# Patient Record
Sex: MALE | Race: WHITE | NOT HISPANIC OR LATINO | Employment: FULL TIME | URBAN - METROPOLITAN AREA
[De-identification: names, ages, dates, MRNs, and addresses within clinical notes are randomized per-mention and may not be internally consistent; named-entity substitution may affect disease eponyms.]

---

## 2017-01-25 ENCOUNTER — OFFICE VISIT (OUTPATIENT)
Dept: OPHTHALMOLOGY | Facility: CLINIC | Age: 62
End: 2017-01-25
Payer: COMMERCIAL

## 2017-01-25 DIAGNOSIS — Z96.1 PSEUDOPHAKIA: Primary | ICD-10-CM

## 2017-01-25 PROCEDURE — 99024 POSTOP FOLLOW-UP VISIT: CPT | Performed by: OPHTHALMOLOGY

## 2017-01-25 ASSESSMENT — VISUAL ACUITY
OD_SC: 20/15
CORRECTION_TYPE: GLASSES
OS_CC: 20/25
OD_SC+: -2
METHOD: SNELLEN - LINEAR

## 2017-01-25 ASSESSMENT — SLIT LAMP EXAM - LIDS
COMMENTS: NORMAL
COMMENTS: NORMAL

## 2017-01-25 ASSESSMENT — REFRACTION_WEARINGRX
OD_CYLINDER: +1.25
SPECS_TYPE: PAL
OD_ADD: +2.75
OS_SPHERE: +0.75
OS_AXIS: 005
OD_SPHERE: -1.25
OS_ADD: +2.75
OS_CYLINDER: +1.25
OD_AXIS: 026

## 2017-01-25 ASSESSMENT — REFRACTION_MANIFEST
OS_CYLINDER: +1.75
OS_ADD: +2.50
OD_SPHERE: -0.25
OS_SPHERE: PLANO
OS_AXIS: 003
OD_ADD: +2.50
OD_CYLINDER: SPHERE

## 2017-01-25 ASSESSMENT — TONOMETRY
OD_IOP_MMHG: 15
IOP_METHOD: APPLANATION

## 2017-01-25 ASSESSMENT — EXTERNAL EXAM - RIGHT EYE: OD_EXAM: ROSACEA

## 2017-01-25 ASSESSMENT — CUP TO DISC RATIO: OD_RATIO: 0.2

## 2017-01-25 NOTE — PATIENT INSTRUCTIONS
1)  Discontinue all drops, unless used prior to cataract surgery.    2)   Fill Rx for new glasses or drugstore readers.    3)   Return to clinic in three months for a pressure check or earlier if problems should arise.     Kartik Garvin M.D.

## 2017-01-25 NOTE — MR AVS SNAPSHOT
"              After Visit Summary   1/25/2017    Angel Butler    MRN: [id number]           Patient Information     Date Of Birth          1955        Visit Information        Provider Department      1/25/2017 2:15 PM Kartik Garvin MD [facility]        Today's Diagnoses     Pseudophakia, od    -  1       Care Instructions    1)  Discontinue all drops, unless used prior to cataract surgery.    2)   Fill Rx for new glasses or drugstore readers.    3)   Return to clinic in three months for a pressure check or earlier if problems should arise.     Kartik Garvin M.D.             Follow-ups after your visit        Who to contact     If you have questions or need follow up information about today's clinic visit or your schedule please contact [facility] directly at [phone].  Normal or non-critical lab and imaging results will be communicated to you by Trudevhart, letter or phone within 4 business days after the clinic has received the results. If you do not hear from us within 7 days, please contact the clinic through Trudevhart or phone. If you have a critical or abnormal lab result, we will notify you by phone as soon as possible.  Submit refill requests through [employer] or call your pharmacy and they will forward the refill request to us. Please allow 3 business days for your refill to be completed.          Additional Information About Your Visit        MyChart Information     [employer] lets you send messages to your doctor, view your test results, renew your prescriptions, schedule appointments and more. To sign up, go to [email or web address] . Click on \"Log in\" on the left side of the screen, which will take you to the Welcome page. Then click on \"Sign up Now\" on the right side of the page.     You will be asked to enter the access code listed below, as well as some personal information. Please follow the directions to create your username and password.     Your access code " is: [email or web address]  Expires: 3/12/2017  2:02 PM     Your access code will  in 90 days. If you need help or a new code, please call your [facility] clinic or [phone].        Care EveryWhere ID     This is your Care EveryWhere ID. This could be used by other organizations to access your [facility] medical records  [id number]         Blood Pressure from Last 3 Encounters:   10/21/16 157/89    Weight from Last 3 Encounters:   No data found for Wt              Today, you had the following     No orders found for display       Primary Care Provider    None Specified       No primary provider on file.        Thank you!     Thank you for choosing [facility]  for your care. Our goal is always to provide you with excellent care. Hearing back from our patients is one way we can continue to improve our services. Please take a few minutes to complete the written survey that you may receive in the mail after your visit with us. Thank you!             Your Updated Medication List - Protect others around you: Learn how to safely use, store and throw away your medicines at www.disposemymeds.org.          This list is accurate as of: 17  3:40 PM.  Always use your most recent med list.                   Brand Name Dispense Instructions for use    diclofenac 0.1 % ophthalmic solution    VOLTAREN    5 mL    Place 1 drop into the right eye 3 times daily       Doxycycline Hyclate 50 MG Tabs     60 tablet    Take 1 tablet by mouth 2 times daily       prednisoLONE acetate 1 % ophthalmic susp    PRED FORTE    5 mL    Place 1 drop into the right eye 3 times daily

## 2017-01-25 NOTE — PROGRESS NOTES
Current Eye Medications:  Diclofenac and Prednisolone 1% twice a day right eye.      Subjective:  Final right eye.  Status/Post Kelman Phacoemulsification with Posterior Chamber Lens, right eye:  12-19-16.  At night, he is aware of a crescent-shaped, arc of light in his temporal vision, of his right eye.  Vision in regular lighting conditions, is very good in his right eye -sharper in his right eye than his left eye, without correction.       Objective:  See Ophthalmology Exam.       Assessment: Doing well status/post Kelman phacoemulsification/ posterior chamber lens right eye.  Probable internal reflections discussed.      Plan:   See Patient Instructions.

## 2017-04-26 ENCOUNTER — OFFICE VISIT (OUTPATIENT)
Dept: OPHTHALMOLOGY | Facility: CLINIC | Age: 62
End: 2017-04-26
Payer: COMMERCIAL

## 2017-04-26 DIAGNOSIS — Z96.1 PSEUDOPHAKIA: Primary | ICD-10-CM

## 2017-04-26 PROCEDURE — 99213 OFFICE O/P EST LOW 20 MIN: CPT | Performed by: OPHTHALMOLOGY

## 2017-04-26 ASSESSMENT — SLIT LAMP EXAM - LIDS
COMMENTS: NORMAL
COMMENTS: NORMAL

## 2017-04-26 ASSESSMENT — REFRACTION_WEARINGRX
OS_SPHERE: +0.75
OS_ADD: +2.75
OD_SPHERE: -1.25
OS_CYLINDER: +1.25
OD_AXIS: 026
OD_CYLINDER: +1.25
SPECS_TYPE: PAL
OD_ADD: +2.75
OS_AXIS: 005

## 2017-04-26 ASSESSMENT — EXTERNAL EXAM - RIGHT EYE: OD_EXAM: ROSACEA

## 2017-04-26 ASSESSMENT — REFRACTION_MANIFEST
OD_SPHERE: PLANO
OD_ADD: +2.75
OS_AXIS: 005
OS_CYLINDER: +1.75
OS_SPHERE: PLANO
OS_ADD: +2.75
OD_CYLINDER: SPHERE

## 2017-04-26 ASSESSMENT — TONOMETRY
OD_IOP_MMHG: 14
IOP_METHOD: TONOPEN
OS_IOP_MMHG: 15

## 2017-04-26 ASSESSMENT — VISUAL ACUITY
OD_SC: 20/15
OS_CC: 20/40
METHOD: SNELLEN - LINEAR

## 2017-04-26 NOTE — MR AVS SNAPSHOT
"              After Visit Summary   2017    Angel Butler    MRN: [id number]           Patient Information     Date Of Birth          1955        Visit Information        Provider Department      2017 10:45 AM Kartik Garvin MD [facility]        Care Instructions    Possible clouding of posterior capsule discussed.   Glasses Rx given - optional   Call in 2017 for an appointment in 2018 for Complete Exam    Dr. Garvin [phone]        Follow-ups after your visit        Who to contact     If you have questions or need follow up information about today's clinic visit or your schedule please contact [facility] directly at [phone].  Normal or non-critical lab and imaging results will be communicated to you by MyChart, letter or phone within 4 business days after the clinic has received the results. If you do not hear from us within 7 days, please contact the clinic through MyChart or phone. If you have a critical or abnormal lab result, we will notify you by phone as soon as possible.  Submit refill requests through [employer] or call your pharmacy and they will forward the refill request to us. Please allow 3 business days for your refill to be completed.          Additional Information About Your Visit        MyChart Information     [employer] lets you send messages to your doctor, view your test results, renew your prescriptions, schedule appointments and more. To sign up, go to [email or web address] . Click on \"Log in\" on the left side of the screen, which will take you to the Welcome page. Then click on \"Sign up Now\" on the right side of the page.     You will be asked to enter the access code listed below, as well as some personal information. Please follow the directions to create your username and password.     Your access code is: [email or web address]  Expires: 2017 11:42 AM     Your access code will  in 90 days. If you need help or a new " code, please call your [facility] clinic or [phone].        Care EveryWhere ID     This is your Care EveryWhere ID. This could be used by other organizations to access your [facility] medical records  [id number]         Blood Pressure from Last 3 Encounters:   10/21/16 157/89    Weight from Last 3 Encounters:   No data found for Wt              Today, you had the following     No orders found for display         Today's Medication Changes          These changes are accurate as of: 4/26/17 11:42 AM.  If you have any questions, ask your nurse or doctor.               Stop taking these medicines if you haven't already. Please contact your care team if you have questions.     diclofenac 0.1 % ophthalmic solution   Commonly known as:  VOLTAREN           prednisoLONE acetate 1 % ophthalmic susp   Commonly known as:  PRED FORTE                    Primary Care Provider    None Specified       No primary provider on file.        Thank you!     Thank you for choosing [facility]  for your care. Our goal is always to provide you with excellent care. Hearing back from our patients is one way we can continue to improve our services. Please take a few minutes to complete the written survey that you may receive in the mail after your visit with us. Thank you!             Your Updated Medication List - Protect others around you: Learn how to safely use, store and throw away your medicines at www.disposemymeds.org.          This list is accurate as of: 4/26/17 11:42 AM.  Always use your most recent med list.                   Brand Name Dispense Instructions for use    Doxycycline Hyclate 50 MG Tabs     60 tablet    Take 1 tablet by mouth 2 times daily

## 2017-04-26 NOTE — PROGRESS NOTES
Current Eye Medications:       Subjective:  4 mo post kpe right eye iop recheck  Pt reports that the post op cataract  rx given for glasses  Were unacceptable to him, so did not take them. Pt states this rx was mistakenly  made to a 2015 prescription power.( called [facility] and  Rx was a  2015 rx)     Objective:  See Ophthalmology Exam.       Assessment:  Doing well status/post Kelman phacoemulsification/ posterior chamber lens right eye.      Plan: Possible clouding of posterior capsule discussed.   Glasses Rx given - optional   Call in December 2017 for an appointment in April 2018 for Complete Exam    Dr. Garvin [phone]

## 2017-04-26 NOTE — PATIENT INSTRUCTIONS
Possible clouding of posterior capsule discussed.   Glasses Rx given - optional   Call in December 2017 for an appointment in April 2018 for Complete Exam    Dr. Garvin [phone]

## 2018-01-30 ENCOUNTER — OFFICE VISIT (OUTPATIENT)
Dept: OPHTHALMOLOGY | Facility: CLINIC | Age: 63
End: 2018-01-30

## 2018-01-30 DIAGNOSIS — H43.812 POSTERIOR VITREOUS DETACHMENT, LEFT: ICD-10-CM

## 2018-01-30 DIAGNOSIS — Z96.1 PSEUDOPHAKIA: Primary | ICD-10-CM

## 2018-01-30 DIAGNOSIS — H52.4 PRESBYOPIA: ICD-10-CM

## 2018-01-30 PROCEDURE — 92014 COMPRE OPH EXAM EST PT 1/>: CPT | Performed by: OPHTHALMOLOGY

## 2018-01-30 PROCEDURE — 92015 DETERMINE REFRACTIVE STATE: CPT | Performed by: OPHTHALMOLOGY

## 2018-01-30 ASSESSMENT — TONOMETRY
OS_IOP_MMHG: 18
OD_IOP_MMHG: 14
IOP_METHOD: APPLANATION

## 2018-01-30 ASSESSMENT — REFRACTION_WEARINGRX
OS_CYLINDER: +1.25
OS_AXIS: 180
OS_SPHERE: +0.25
OD_CYLINDER: +0.25
SPECS_TYPE: PAL
OD_SPHERE: +0.75
OD_AXIS: 175
OS_ADD: +2.25
OD_ADD: +2.25

## 2018-01-30 ASSESSMENT — CUP TO DISC RATIO
OS_RATIO: 0.2
OD_RATIO: 0.2

## 2018-01-30 ASSESSMENT — VISUAL ACUITY
METHOD: SNELLEN - LINEAR
OS_CC: 20/20 SLOW
OS_CC+: -1
CORRECTION_TYPE: GLASSES
OD_CC: 20/20
OD_CC+: -1

## 2018-01-30 ASSESSMENT — REFRACTION_MANIFEST
OD_CYLINDER: +0.50
OD_SPHERE: PLANO
OS_ADD: +2.50
OS_CYLINDER: +1.50
OS_SPHERE: -0.25
OS_AXIS: 180
OD_AXIS: 165
OD_ADD: +2.50

## 2018-01-30 ASSESSMENT — SLIT LAMP EXAM - LIDS
COMMENTS: NORMAL
COMMENTS: NORMAL

## 2018-01-30 ASSESSMENT — CONF VISUAL FIELD
OD_NORMAL: 1
OS_NORMAL: 1

## 2018-01-30 NOTE — PROGRESS NOTES
Current Eye Medications: OTC allergy drops prn both eyes     Subjective:  Here for complete.  Has been wearing these glasses from 2012 from before the cataract surgery. Feels that he could use a better Rx and getting harder to read.  Is easier to drive at night since having surgery in the right eye.      Objective:  See Ophthalmology Exam.       Assessment:  Stable eye exam.      ICD-10-CM    1. Pseudophakia, od Z96.1 EYE EXAM (SIMPLE-NONBILLABLE)   2. Posterior vitreous detachment, left H43.812    3. Presbyopia H52.4 REFRACTIVE STATUS        Plan:  Glasses Rx given - optional  Possible posterior vitreous detachment (sudden onset large floater and/or flashing lights) right eye discussed.  Possible clouding of posterior capsule right eye discussed.  Call in September 2018 for an appointment in January 2019 for Complete Exam    Dr. Garvin [phone]

## 2018-01-30 NOTE — PATIENT INSTRUCTIONS
Glasses Rx given - optional  Possible posterior vitreous detachment (sudden onset large floater and/or flashing lights) right eye discussed.  Possible clouding of posterior capsule right eye discussed.  Call in September 2018 for an appointment in January 2019 for Complete Exam    Dr. Garvin [phone]

## 2018-01-30 NOTE — MR AVS SNAPSHOT
"              After Visit Summary   1/30/2018    Angel Butler    MRN: [id number]           Patient Information     Date Of Birth          1955        Visit Information        Provider Department      1/30/2018 3:00 PM Kartik Garvin MD [facility]        Today's Diagnoses     Presbyopia    -  1      Care Instructions    Glasses Rx given - optional  Possible posterior vitreous detachment (sudden onset large floater and/or flashing lights) right eye discussed.  Possible clouding of posterior capsule right eye discussed.  Call in September 2018 for an appointment in January 2019 for Complete Exam    Dr. Garvin [phone]              Follow-ups after your visit        Who to contact     If you have questions or need follow up information about today's clinic visit or your schedule please contact [facility] directly at [phone].  Normal or non-critical lab and imaging results will be communicated to you by Executive Channel[employer], letter or phone within 4 business days after the clinic has received the results. If you do not hear from us within 7 days, please contact the clinic through MyChart or phone. If you have a critical or abnormal lab result, we will notify you by phone as soon as possible.  Submit refill requests through [employer] or call your pharmacy and they will forward the refill request to us. Please allow 3 business days for your refill to be completed.          Additional Information About Your Visit        MyChart Information     [employer] lets you send messages to your doctor, view your test results, renew your prescriptions, schedule appointments and more. To sign up, go to [email or web address] . Click on \"Log in\" on the left side of the screen, which will take you to the Welcome page. Then click on \"Sign up Now\" on the right side of the page.     You will be asked to enter the access code listed below, as well as some personal information. Please follow the directions to " create your username and password.     Your access code is: [email or web address]  Expires: 2018  4:33 PM     Your access code will  in 90 days. If you need help or a new code, please call your [facility] clinic or [phone].        Care EveryWhere ID     This is your Care EveryWhere ID. This could be used by other organizations to access your [facility] medical records  [id number]         Blood Pressure from Last 3 Encounters:   10/21/16 157/89    Weight from Last 3 Encounters:   No data found for Wt              Today, you had the following     No orders found for display       Primary Care Provider Fax #    Provider Not In System [phone]                Equal Access to Services     YOUSUF BEGUM : Billie Bojorquez, wadeborah meyer, qalito kaalmada nova, katina quiroz . So [facility] [phone].    ATENCIÓN: Si habla español, tiene a sanches disposición servicios gratuitos de asistencia lingüística. Llame al [phone].    We comply with applicable federal civil rights laws and [place] laws. We do not discriminate on the basis of race, color, national origin, age, disability, sex, sexual orientation, or gender identity.            Thank you!     Thank you for choosing [facility]  for your care. Our goal is always to provide you with excellent care. Hearing back from our patients is one way we can continue to improve our services. Please take a few minutes to complete the written survey that you may receive in the mail after your visit with us. Thank you!             Your Updated Medication List - Protect others around you: Learn how to safely use, store and throw away your medicines at www.disposemymeds.org.          This list is accurate as of 18  4:33 PM.  Always use your most recent med list.                   Brand Name Dispense Instructions for use Diagnosis    Doxycycline Hyclate 50 MG Tabs     60 tablet    Take 1 tablet by mouth 2 times daily     Rosacea

## 2018-01-30 NOTE — LETTER
1/30/2018         RE: Angel Butler  [address] [place] [address]        Dear Colleague,    Thank you for referring your patient, Angel Butler, to the [facility]. Please see a copy of my visit note below.     Current Eye Medications: OTC allergy drops prn both eyes     Subjective:  Here for complete.  Has been wearing these glasses from 2012 from before the cataract surgery. Feels that he could use a better Rx and getting harder to read.  Is easier to drive at night since having surgery in the right eye.      Objective:  See Ophthalmology Exam.       Assessment:  Stable eye exam.      ICD-10-CM    1. Pseudophakia, od Z96.1 EYE EXAM (SIMPLE-NONBILLABLE)   2. Posterior vitreous detachment, left H43.812    3. Presbyopia H52.4 REFRACTIVE STATUS        Plan:  Glasses Rx given - optional  Possible posterior vitreous detachment (sudden onset large floater and/or flashing lights) right eye discussed.  Possible clouding of posterior capsule right eye discussed.  Call in September 2018 for an appointment in January 2019 for Complete Exam    Dr. Garvin [phone]          Again, thank you for allowing me to participate in the care of your patient.        Sincerely,        Kartik Garvin MD

## 2019-07-25 ENCOUNTER — DOCUMENTATION ONLY (OUTPATIENT)
Dept: OPHTHALMOLOGY | Facility: CLINIC | Age: 64
End: 2019-07-25

## 2023-01-20 ENCOUNTER — TRANSFERRED RECORDS (OUTPATIENT)
Dept: HEALTH INFORMATION MANAGEMENT | Facility: CLINIC | Age: 68
End: 2023-01-20

## 2023-01-31 ENCOUNTER — ANCILLARY ORDERS (OUTPATIENT)
Dept: URGENT CARE | Facility: RETAIL CLINIC | Age: 68
End: 2023-01-31

## 2023-01-31 ENCOUNTER — MEDICAL CORRESPONDENCE (OUTPATIENT)
Dept: HEALTH INFORMATION MANAGEMENT | Facility: CLINIC | Age: 68
End: 2023-01-31

## 2023-01-31 ENCOUNTER — ANCILLARY ORDERS (OUTPATIENT)
Dept: CT IMAGING | Facility: CLINIC | Age: 68
End: 2023-01-31

## 2023-01-31 DIAGNOSIS — R53.83 FATIGUE, UNSPECIFIED TYPE: ICD-10-CM

## 2023-02-02 ENCOUNTER — TRANSCRIBE ORDERS (OUTPATIENT)
Dept: OTHER | Age: 68
End: 2023-02-02

## 2023-02-02 DIAGNOSIS — R53.83 FATIGUE, UNSPECIFIED TYPE: Primary | ICD-10-CM

## 2023-02-07 ENCOUNTER — ANCILLARY ORDERS (OUTPATIENT)
Dept: CT IMAGING | Facility: CLINIC | Age: 68
End: 2023-02-07

## 2023-02-08 ENCOUNTER — ANCILLARY ORDERS (OUTPATIENT)
Dept: CT IMAGING | Facility: CLINIC | Age: 68
End: 2023-02-08

## 2023-02-08 DIAGNOSIS — R53.83 FATIGUE, UNSPECIFIED TYPE: ICD-10-CM

## 2023-02-16 ENCOUNTER — ANCILLARY PROCEDURE (OUTPATIENT)
Dept: CT IMAGING | Facility: CLINIC | Age: 68
End: 2023-02-16
Attending: NURSE PRACTITIONER

## 2023-02-16 DIAGNOSIS — R53.83 FATIGUE, UNSPECIFIED TYPE: ICD-10-CM

## 2023-02-16 PROCEDURE — 75571 CT HRT W/O DYE W/CA TEST: CPT | Performed by: INTERNAL MEDICINE

## 2023-04-10 ENCOUNTER — TELEPHONE (OUTPATIENT)
Dept: CARDIOLOGY | Facility: CLINIC | Age: 68
End: 2023-04-10
Payer: COMMERCIAL

## 2023-04-10 NOTE — TELEPHONE ENCOUNTER
RECORDS RECEIVED FROM: JONAH Canales CNP   DIAGNOSIS: Fatigue, unspecified    DATE RECEIVED: 02/02/23   NOTES STATUS DETAILS   OFFICE NOTE from referring provider  Internal Referred by JONAH Canales CNP   OFFICE NOTE from other specialist   Care Everywhere Last saw JONAH Bearden CNP at [facility]   DISCHARGE SUMMARY from hospital  N/A    DISCHARGE REPORT from the ER N/A    OPERATIVE REPORT  N/A    MEDICATION LIST Internal    LABS      N/A    DIAGNOSTIC PROCEDURES      N/A    IMAGING (DISC & REPORT)      CT  Care Everywhere and internal  [facility] 09/18/19    Internal 02/16/23   MRI Care Everywhere [facility] 09/19/19   XRAY N/A    ULTRASOUND  Care Everywhere [facility] 11/20/17, 09/19/19     Both echo and MRA has been requested from [facility], will check PACS later to see if images are available.    BREANNA Saab   Cardiology Team  [facility]

## 2023-04-12 ENCOUNTER — TELEPHONE (OUTPATIENT)
Dept: CARDIOLOGY | Facility: CLINIC | Age: 68
End: 2023-04-12

## 2023-04-12 NOTE — TELEPHONE ENCOUNTER
Called and left message on personal voicemail that the new appointment would be information gathering on his health, family history, review previous cardiac testing, make further recommendations. He could have an EKG done, but probably no other testing that day. Asked him to whit back if he has additional questions.     Anahi García RN  Cardiology Care Coordinator  [facility]  Phone: [phone]

## 2023-04-12 NOTE — TELEPHONE ENCOUNTER
Health Call Center    Phone Message    May a detailed message be left on voicemail: yes     Reason for Call: Other: Angel called to reschedule his appt with Dr. Jones due to a schedule conflict but he was also wanting a call back to discuss what will be happening at his initial appt with . Please reach out to Angel to discuss. Thank you!     Action Taken: Other: Cardiology    Travel Screening: Not Applicable     Thank you!  [facility]

## 2023-04-14 NOTE — TELEPHONE ENCOUNTER
All images are available in PACS to review.    BREANNA Saab   Cardiology Team  [facility]